# Patient Record
Sex: FEMALE | Race: WHITE | Employment: OTHER | URBAN - METROPOLITAN AREA
[De-identification: names, ages, dates, MRNs, and addresses within clinical notes are randomized per-mention and may not be internally consistent; named-entity substitution may affect disease eponyms.]

---

## 2018-05-08 ENCOUNTER — HOSPITAL ENCOUNTER (OUTPATIENT)
Age: 73
Discharge: HOME | End: 2018-05-10
Payer: MEDICARE

## 2018-05-08 PROCEDURE — 82565 ASSAY OF CREATININE: CPT

## 2018-05-08 PROCEDURE — 88112 CYTOPATH CELL ENHANCE TECH: CPT

## 2018-05-08 PROCEDURE — 84520 ASSAY OF UREA NITROGEN: CPT

## 2019-01-04 ENCOUNTER — HOSPITAL ENCOUNTER (OUTPATIENT)
Age: 74
Discharge: HOME | End: 2019-01-06
Payer: MEDICARE

## 2019-01-04 LAB
BACTERIA: ABNORMAL /HPF
BILIRUBIN URINE: NEGATIVE
BLOOD, URINE: ABNORMAL
CLARITY: CLEAR
COLOR: YELLOW
GLUCOSE URINE: NEGATIVE MG/DL
KETONES, URINE: NEGATIVE MG/DL
LEUKOCYTE ESTERASE, URINE: ABNORMAL
NITRITE, URINE: NEGATIVE
PH UA: 6 (ref 5–9)
PROTEIN UA: NEGATIVE MG/DL
RBC UA: ABNORMAL /HPF (ref 0–2)
SPECIFIC GRAVITY UA: 1.02 (ref 1–1.03)
UROBILINOGEN, URINE: 0.2 E.U./DL
WBC UA: ABNORMAL /HPF (ref 0–5)

## 2019-01-04 PROCEDURE — 88112 CYTOPATH CELL ENHANCE TECH: CPT

## 2019-01-04 PROCEDURE — 81001 URINALYSIS AUTO W/SCOPE: CPT

## 2019-01-04 PROCEDURE — 87088 URINE BACTERIA CULTURE: CPT

## 2019-01-06 LAB — URINE CULTURE, ROUTINE: NORMAL

## 2019-01-07 LAB — URINE CULTURE, ROUTINE: NORMAL

## 2019-04-18 LAB
ALBUMIN SERPL-MCNC: NORMAL G/DL
ALP BLD-CCNC: NORMAL U/L
ALT SERPL-CCNC: NORMAL U/L
ANION GAP SERPL CALCULATED.3IONS-SCNC: NORMAL MMOL/L
AST SERPL-CCNC: NORMAL U/L
BILIRUB SERPL-MCNC: NORMAL MG/DL (ref 0.1–1.4)
BUN BLDV-MCNC: NORMAL MG/DL
CALCIUM SERPL-MCNC: NORMAL MG/DL
CHLORIDE BLD-SCNC: NORMAL MMOL/L
CHOLESTEROL, TOTAL: NORMAL MG/DL
CHOLESTEROL/HDL RATIO: NORMAL
CO2: NORMAL MMOL/L
CREAT SERPL-MCNC: NORMAL MG/DL
GFR CALCULATED: NORMAL
GLUCOSE BLD-MCNC: NORMAL MG/DL
HDLC SERPL-MCNC: NORMAL MG/DL (ref 35–70)
LDL CHOLESTEROL CALCULATED: NORMAL MG/DL (ref 0–160)
POTASSIUM SERPL-SCNC: NORMAL MMOL/L
SODIUM BLD-SCNC: NORMAL MMOL/L
TOTAL PROTEIN: NORMAL
TRIGL SERPL-MCNC: NORMAL MG/DL
VLDLC SERPL CALC-MCNC: NORMAL MG/DL

## 2019-07-05 RX ORDER — ATORVASTATIN CALCIUM 20 MG/1
20 TABLET, FILM COATED ORAL DAILY
Qty: 90 TABLET | Refills: 0 | OUTPATIENT
Start: 2019-07-05

## 2019-07-08 RX ORDER — ATORVASTATIN CALCIUM 10 MG/1
TABLET, FILM COATED ORAL
Qty: 30 TABLET | Refills: 2 | Status: CANCELLED | OUTPATIENT
Start: 2019-07-08

## 2019-07-08 RX ORDER — DULOXETIN HYDROCHLORIDE 30 MG/1
30 CAPSULE, DELAYED RELEASE ORAL DAILY
Qty: 30 CAPSULE | Refills: 2 | Status: CANCELLED | OUTPATIENT
Start: 2019-07-08

## 2019-07-08 RX ORDER — ATORVASTATIN CALCIUM 10 MG/1
TABLET, FILM COATED ORAL
Qty: 30 TABLET | Refills: 2 | Status: SHIPPED | OUTPATIENT
Start: 2019-07-08 | End: 2019-10-01 | Stop reason: SDUPTHER

## 2019-07-08 RX ORDER — DULOXETIN HYDROCHLORIDE 30 MG/1
30 CAPSULE, DELAYED RELEASE ORAL DAILY
Qty: 30 CAPSULE | Refills: 2 | Status: SHIPPED | OUTPATIENT
Start: 2019-07-08 | End: 2019-11-25 | Stop reason: SDUPTHER

## 2019-07-08 NOTE — TELEPHONE ENCOUNTER
Spoke with patient and she has 10mg on her bottle at home that she has been taking. Also requested refill on Cymbalta.

## 2019-07-08 NOTE — TELEPHONE ENCOUNTER
It makes sense that her bottle says 10mg, that is what I have been prescribing. I was confused when 20mg pill was pended. I didn't want to send in the wrong dose. I approved pended meds, have her get bw and fu as previously directed  (see message below).  thx

## 2019-09-30 DIAGNOSIS — Z00.00 HEALTH MAINTENANCE EXAMINATION: Primary | ICD-10-CM

## 2019-10-01 RX ORDER — ATORVASTATIN CALCIUM 10 MG/1
10 TABLET, FILM COATED ORAL DAILY
Qty: 30 TABLET | Refills: 1 | Status: SHIPPED | OUTPATIENT
Start: 2019-10-01

## 2019-10-25 RX ORDER — METOPROLOL TARTRATE 50 MG/1
25 TABLET, FILM COATED ORAL 2 TIMES DAILY
COMMUNITY

## 2019-11-15 DIAGNOSIS — Z00.00 HEALTH MAINTENANCE EXAMINATION: ICD-10-CM

## 2019-11-22 ENCOUNTER — HOSPITAL ENCOUNTER (OUTPATIENT)
Age: 74
Discharge: HOME | End: 2019-11-24
Payer: MEDICARE

## 2019-11-22 LAB
ALBUMIN SERPL-MCNC: 4.1 G/DL (ref 3.5–5.2)
ALP BLD-CCNC: 70 U/L (ref 35–104)
ALT SERPL-CCNC: 9 U/L (ref 0–32)
ANION GAP SERPL CALCULATED.3IONS-SCNC: 16 MMOL/L (ref 7–16)
AST SERPL-CCNC: 12 U/L (ref 0–31)
BACTERIA: ABNORMAL /HPF
BASOPHILS ABSOLUTE: 0.06 E9/L (ref 0–0.2)
BASOPHILS RELATIVE PERCENT: 0.9 % (ref 0–2)
BILIRUB SERPL-MCNC: 0.7 MG/DL (ref 0–1.2)
BILIRUBIN URINE: NEGATIVE
BLOOD, URINE: ABNORMAL
BUN BLDV-MCNC: 9 MG/DL (ref 8–23)
CALCIUM SERPL-MCNC: 9.2 MG/DL (ref 8.6–10.2)
CHLORIDE BLD-SCNC: 103 MMOL/L (ref 98–107)
CHOLESTEROL, TOTAL: 117 MG/DL (ref 0–199)
CLARITY: ABNORMAL
CO2: 23 MMOL/L (ref 22–29)
COLOR: YELLOW
CREAT SERPL-MCNC: 0.7 MG/DL (ref 0.5–1)
CREATININE URINE: 165 MG/DL (ref 29–226)
EOSINOPHILS ABSOLUTE: 0.14 E9/L (ref 0.05–0.5)
EOSINOPHILS RELATIVE PERCENT: 2.2 % (ref 0–6)
EPITHELIAL CELLS, UA: ABNORMAL /HPF
GFR AFRICAN AMERICAN: >60
GFR NON-AFRICAN AMERICAN: >60 ML/MIN/1.73
GLUCOSE BLD-MCNC: 156 MG/DL (ref 74–99)
GLUCOSE URINE: NEGATIVE MG/DL
HBA1C MFR BLD: 7 % (ref 4–5.6)
HCT VFR BLD CALC: 43.9 % (ref 34–48)
HDLC SERPL-MCNC: 53 MG/DL
HEMOGLOBIN: 13.5 G/DL (ref 11.5–15.5)
IMMATURE GRANULOCYTES #: 0.02 E9/L
IMMATURE GRANULOCYTES %: 0.3 % (ref 0–5)
KETONES, URINE: NEGATIVE MG/DL
LDL CHOLESTEROL CALCULATED: 42 MG/DL (ref 0–99)
LEUKOCYTE ESTERASE, URINE: ABNORMAL
LYMPHOCYTES ABSOLUTE: 1.51 E9/L (ref 1.5–4)
LYMPHOCYTES RELATIVE PERCENT: 23.6 % (ref 20–42)
MCH RBC QN AUTO: 28.1 PG (ref 26–35)
MCHC RBC AUTO-ENTMCNC: 30.8 % (ref 32–34.5)
MCV RBC AUTO: 91.5 FL (ref 80–99.9)
MICROALBUMIN UR-MCNC: <12 MG/L
MICROALBUMIN/CREAT UR-RTO: ABNORMAL (ref 0–30)
MONOCYTES ABSOLUTE: 0.47 E9/L (ref 0.1–0.95)
MONOCYTES RELATIVE PERCENT: 7.3 % (ref 2–12)
NEUTROPHILS ABSOLUTE: 4.2 E9/L (ref 1.8–7.3)
NEUTROPHILS RELATIVE PERCENT: 65.7 % (ref 43–80)
NITRITE, URINE: NEGATIVE
PDW BLD-RTO: 13 FL (ref 11.5–15)
PH UA: 5.5 (ref 5–9)
PLATELET # BLD: 231 E9/L (ref 130–450)
PMV BLD AUTO: 12.5 FL (ref 7–12)
POTASSIUM SERPL-SCNC: 4.5 MMOL/L (ref 3.5–5)
PROTEIN UA: NEGATIVE MG/DL
RBC # BLD: 4.8 E12/L (ref 3.5–5.5)
RBC UA: ABNORMAL /HPF (ref 0–2)
SODIUM BLD-SCNC: 142 MMOL/L (ref 132–146)
SPECIFIC GRAVITY UA: 1.02 (ref 1–1.03)
TOTAL CK: 61 U/L (ref 20–180)
TOTAL PROTEIN: 6.9 G/DL (ref 6.4–8.3)
TRIGL SERPL-MCNC: 108 MG/DL (ref 0–149)
TSH SERPL DL<=0.05 MIU/L-ACNC: 1.98 UIU/ML (ref 0.27–4.2)
UROBILINOGEN, URINE: 0.2 E.U./DL
VLDLC SERPL CALC-MCNC: 22 MG/DL
WBC # BLD: 6.4 E9/L (ref 4.5–11.5)
WBC UA: ABNORMAL /HPF (ref 0–5)

## 2019-11-22 PROCEDURE — 83036 HEMOGLOBIN GLYCOSYLATED A1C: CPT

## 2019-11-22 PROCEDURE — 81001 URINALYSIS AUTO W/SCOPE: CPT

## 2019-11-22 PROCEDURE — 80061 LIPID PANEL: CPT

## 2019-11-22 PROCEDURE — 82550 ASSAY OF CK (CPK): CPT

## 2019-11-22 PROCEDURE — 82570 ASSAY OF URINE CREATININE: CPT

## 2019-11-22 PROCEDURE — 85025 COMPLETE CBC W/AUTO DIFF WBC: CPT

## 2019-11-22 PROCEDURE — 84443 ASSAY THYROID STIM HORMONE: CPT

## 2019-11-22 PROCEDURE — 82044 UR ALBUMIN SEMIQUANTITATIVE: CPT

## 2019-11-22 PROCEDURE — 80053 COMPREHEN METABOLIC PANEL: CPT

## 2019-11-22 PROCEDURE — 36415 COLL VENOUS BLD VENIPUNCTURE: CPT

## 2019-11-25 ENCOUNTER — OFFICE VISIT (OUTPATIENT)
Dept: PRIMARY CARE CLINIC | Age: 74
End: 2019-11-25
Payer: MEDICARE

## 2019-11-25 VITALS
BODY MASS INDEX: 38.35 KG/M2 | HEART RATE: 66 BPM | SYSTOLIC BLOOD PRESSURE: 134 MMHG | OXYGEN SATURATION: 93 % | WEIGHT: 224.6 LBS | HEIGHT: 64 IN | DIASTOLIC BLOOD PRESSURE: 72 MMHG

## 2019-11-25 DIAGNOSIS — R31.29 OTHER MICROSCOPIC HEMATURIA: ICD-10-CM

## 2019-11-25 DIAGNOSIS — E11.65 TYPE 2 DIABETES MELLITUS WITH HYPERGLYCEMIA, WITHOUT LONG-TERM CURRENT USE OF INSULIN (HCC): Primary | ICD-10-CM

## 2019-11-25 DIAGNOSIS — E55.9 VITAMIN D DEFICIENCY: ICD-10-CM

## 2019-11-25 DIAGNOSIS — M85.80 OSTEOPENIA, UNSPECIFIED LOCATION: ICD-10-CM

## 2019-11-25 DIAGNOSIS — Z00.00 HEALTH MAINTENANCE EXAMINATION: ICD-10-CM

## 2019-11-25 DIAGNOSIS — E78.2 MIXED HYPERLIPIDEMIA: ICD-10-CM

## 2019-11-25 DIAGNOSIS — M79.7 FIBROMYALGIA: ICD-10-CM

## 2019-11-25 DIAGNOSIS — I10 ESSENTIAL HYPERTENSION: ICD-10-CM

## 2019-11-25 PROCEDURE — G8417 CALC BMI ABV UP PARAM F/U: HCPCS | Performed by: FAMILY MEDICINE

## 2019-11-25 PROCEDURE — 4040F PNEUMOC VAC/ADMIN/RCVD: CPT | Performed by: FAMILY MEDICINE

## 2019-11-25 PROCEDURE — 1123F ACP DISCUSS/DSCN MKR DOCD: CPT | Performed by: FAMILY MEDICINE

## 2019-11-25 PROCEDURE — G8399 PT W/DXA RESULTS DOCUMENT: HCPCS | Performed by: FAMILY MEDICINE

## 2019-11-25 PROCEDURE — G8482 FLU IMMUNIZE ORDER/ADMIN: HCPCS | Performed by: FAMILY MEDICINE

## 2019-11-25 PROCEDURE — 1036F TOBACCO NON-USER: CPT | Performed by: FAMILY MEDICINE

## 2019-11-25 PROCEDURE — 3017F COLORECTAL CA SCREEN DOC REV: CPT | Performed by: FAMILY MEDICINE

## 2019-11-25 PROCEDURE — 99215 OFFICE O/P EST HI 40 MIN: CPT | Performed by: FAMILY MEDICINE

## 2019-11-25 PROCEDURE — G8427 DOCREV CUR MEDS BY ELIG CLIN: HCPCS | Performed by: FAMILY MEDICINE

## 2019-11-25 PROCEDURE — 3045F PR MOST RECENT HEMOGLOBIN A1C LEVEL 7.0-9.0%: CPT | Performed by: FAMILY MEDICINE

## 2019-11-25 PROCEDURE — 2022F DILAT RTA XM EVC RTNOPTHY: CPT | Performed by: FAMILY MEDICINE

## 2019-11-25 PROCEDURE — 1090F PRES/ABSN URINE INCON ASSESS: CPT | Performed by: FAMILY MEDICINE

## 2019-11-25 PROCEDURE — G9899 SCRN MAM PERF RSLTS DOC: HCPCS | Performed by: FAMILY MEDICINE

## 2019-11-25 RX ORDER — MELOXICAM 15 MG/1
15 TABLET ORAL DAILY
Qty: 90 TABLET | Refills: 3 | Status: ON HOLD
Start: 2019-11-25 | End: 2022-03-23 | Stop reason: HOSPADM

## 2019-11-25 RX ORDER — DULOXETIN HYDROCHLORIDE 30 MG/1
30 CAPSULE, DELAYED RELEASE ORAL DAILY
Qty: 90 CAPSULE | Refills: 3 | Status: SHIPPED | OUTPATIENT
Start: 2019-11-25

## 2019-11-25 ASSESSMENT — PATIENT HEALTH QUESTIONNAIRE - PHQ9
SUM OF ALL RESPONSES TO PHQ9 QUESTIONS 1 & 2: 0
SUM OF ALL RESPONSES TO PHQ QUESTIONS 1-9: 0
SUM OF ALL RESPONSES TO PHQ QUESTIONS 1-9: 0
1. LITTLE INTEREST OR PLEASURE IN DOING THINGS: 0
2. FEELING DOWN, DEPRESSED OR HOPELESS: 0

## 2019-12-02 ENCOUNTER — TELEPHONE (OUTPATIENT)
Dept: PRIMARY CARE CLINIC | Age: 74
End: 2019-12-02

## 2019-12-25 PROBLEM — Z00.00 HEALTH MAINTENANCE EXAMINATION: Status: RESOLVED | Noted: 2019-11-25 | Resolved: 2019-12-25

## 2019-12-31 DIAGNOSIS — M79.7 FIBROMYALGIA: ICD-10-CM

## 2022-03-22 RX ORDER — VALSARTAN 320 MG/1
320 TABLET ORAL DAILY
COMMUNITY

## 2022-03-22 NOTE — H&P
History and Physical    Patient's Name/Date of Birth: Percy Jan / 1945 (16 y.o.)    Date: March 22, 2022     Chief Complaint: right index finger infection    HPI: This is a 78years of age, left -hand-dominant, white female, who has progressive redness swelling and pain related to the right index finger P3 tissues. Patient describes progression of the symptoms and signs in and around the nail folds. She has elevation of the nail plate. She has green discoloration now of the nail plate with seemingly infection underneath the nail plate. She did go to urgent care on a Sunday night because of of her overall condition. They did rishabh the proximal nail fold seemingly with a needle and no true incision. There is very little purulent drainage, no patient was instructed on Epson salt soaks and mupirocin ointment for dressing care. She describes worsening of the condition with increasing redness to a severe extent, increasing pain to severe, and a dramatic decrease in range of motion and decreased overall right index finger and hand function. Past Medical History:   Diagnosis Date    Arthritis     Diabetes mellitus ([address]     Fibromyalgia     Hypertension     Osteoarthritis     Spinal stenosis     Type 2 diabetes mellitus without complication ([address]        Past Surgical History:   Procedure Laterality Date    CARDIAC CATHETERIZATION  2005    NL after false positive stress test    CARPAL TUNNEL RELEASE  1990    HEEL SPUR SURGERY Bilateral 1995    HYSTERECTOMY      WITH BSO FOR EXCESSIVE BLEEDING AND FIBROIDS    JOINT REPLACEMENT      JOINT REPLACEMENT Right     4/28/2015    KNEE ARTHROPLASTY Left 2005    KNEE ARTHROPLASTY Right 2015    TONSILLECTOMY AND ADENOIDECTOMY         Prior to Admission medications    Medication Sig Start Date End Date Taking?  Authorizing Provider   valsartan (DIOVAN) 320 MG tablet Take 320 mg by mouth daily   Yes Historical Provider, MD   meloxicam (MOBIC) 15 MG tablet Take 1 tablet by mouth daily PRN  Patient taking differently: Take 15 mg by mouth daily  11/25/19   Myke Morton MD   DULoxetine (CYMBALTA) 30 MG extended release capsule Take 1 capsule by mouth daily  Patient taking differently: Take 30 mg by mouth at bedtime  11/25/19   Myke Morton MD   metFORMIN (GLUCOPHAGE) 1000 MG tablet Take 1,000 mg by mouth 2 times daily (with meals)    Historical Provider, MD   metoprolol tartrate (LOPRESSOR) 50 MG tablet Take 25 mg by mouth 2 times daily     Historical Provider, MD   atorvastatin (LIPITOR) 10 MG tablet Take 1 tablet by mouth daily One by mouth once daily 10/1/19   Myke Morton MD   Handicap Placard MISC Duration 5 years 9/30/19   Myke Morton MD       Diclofenac and Diclofenac sodium    Family History   Problem Relation Age of Onset    Cancer Mother 47        MULTIPLE MYELOMA    Heart Attack Father 46    Arthritis Sister         OSTEOARTHRITIS    Obesity Sister     Stroke Other        Social History     Socioeconomic History    Marital status:      Spouse name: Not on file    Number of children: Not on file    Years of education: Not on file    Highest education level: Not on file   Occupational History    Not on file   Tobacco Use    Smoking status: Never Smoker    Smokeless tobacco: Never Used   Substance and Sexual Activity    Alcohol use: Yes     Comment: social    Drug use: No    Sexual activity: Not on file   Other Topics Concern    Not on file   Social History Narrative    [facility]:    Shot Record: 90732-Pneumococcal Vaccine (Pneumovax) 06/29/16    Problem List: Essential hypertension    Health Maintenance:    Bone Density Scan - (11/2/2018)    Mammogram - (11/2/2018)    Mammogram Screening - (11/2/2018)    Colonoscopy - (5/4/2009)    Couseled on Home Safety - (4/26/2017)    Pneumonia Vaccination - (6/29/2016)    Medical Problems:    Type 2 Diabetes    OA - has been on nsaids since 20's    Fibromyalgia, Hypertension    Gib - since been on celebrex and doing well    Spinal Stenosis    Surgical Hx:    Cardiac Cath -  - NL after false positive stress test    Left Knee arthroplasty -     Rt Knee arthroplasty -     B/L Heel Spur -     Carpal Tunnel Release -     Total Hysterectomy - with BSO for excessive bleeding and fibroids    T & A    Reviewed, no changes. FH:    Father:    . (Hx)    Mother:    . (Hx)    Brother 1:    . (Hx)    Sister 1:    . (Hx)    Dad -  MI sudden 46, very obese    Mom -  Multiple Myeloma 47    Sister - 76 as of 2016, OA, Obesity    Daughter CVA age 52    Reviewed and updated. SH:    . 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 Alcohol: Denies alcohol use. . (Drug Use)    Reviewed, no changes. Social Determinants of Health     Financial Resource Strain:     Difficulty of Paying Living Expenses: Not on file   Food Insecurity:     Worried About Running Out of Food in the Last Year: Not on file    Lauro of Food in the Last Year: Not on file   Transportation Needs:     Lack of Transportation (Medical): Not on file    Lack of Transportation (Non-Medical):  Not on file   Physical Activity:     Days of Exercise per Week: Not on file    Minutes of Exercise per Session: Not on file   Stress:     Feeling of Stress : Not on file   Social Connections:     Frequency of Communication with Friends and Family: Not on file    Frequency of Social Gatherings with Friends and Family: Not on file    Attends Hoahaoism Services: Not on file    Active Member of Clubs or Organizations: Not on file    Attends Club or Organization Meetings: Not on file    Marital Status: Not on file   Intimate Partner Violence:     Fear of Current or Ex-Partner: Not on file  Emotionally Abused: Not on file    Physically Abused: Not on file    Sexually Abused: Not on file   Housing Stability:     Unable to Pay for Housing in the Last Year: Not on file    Number of Places Lived in the Last Year: Not on file    Unstable Housing in the Last Year: Not on file       Review of Systems:   CONSTITUTIONAL:  negative  EYES:  negative  HEENT:  negative  RESPIRATORY:  negative  CARDIOVASCULAR:  negative  GASTROINTESTINAL:  negative  GENITOURINARY:  negative  INTEGUMENT/BREAST:  negative  HEMATOLOGIC/LYMPHATIC:  negative  ALLERGIC/IMMUNOLOGIC:  negative  ENDOCRINE:  negative  MUSCULOSKELETAL:  negative  NEUROLOGICAL:  negative  BEHAVIOR/PSYCH:  negative    Physical Exam:  Vitals:    03/22/22 1650   Weight: 217 lb (98.4 kg)   Height: 5' 5\" (1.651 m)       CONSTITUTIONAL:  awake, alert, cooperative, no apparent distress, and appears stated age  EYES:  Lids and lashes normal, pupils equal, round and reactive to light, extra ocular muscles intact, sclera clear, conjunctiva normal  ENT:  Normocephalic, without obvious abnormality, atraumatic, sinuses nontender on palpation, external ears without lesions, oral pharynx with moist mucus membranes, tonsils without erythema or exudates, gums normal and good dentition.   NECK:  Supple, symmetrical, trachea midline, no adenopathy, thyroid symmetric, not enlarged and no tenderness, skin normal  HEMATOLOGIC/LYMPHATICS:  no cervical lymphadenopathy  BACK:  Symmetric, no curvature, spinous processes are non-tender on palpation, paraspinous muscles are non-tender on palpation, no costal vertebral tenderness  LUNGS:  No increased work of breathing, good air exchange, clear to auscultation bilaterally, no crackles or wheezing  CARDIOVASCULAR:  normal S1 and S2  ABDOMEN:  deferred  CHEST/BREASTS:  deferred  GENITAL/URINARY:  deferred  MUSCULOSKELETAL:  + red discoloration, severe, right index finger  + edema , severe right index finger  + pain , severe, right index finger  + green nail, right index finger  NEUROLOGIC:  Awake, alert, oriented to name, place and time. Cranial nerves II-XII are grossly intact. Motor is 5 out of 5 bilaterally. Cerebellar finger to nose, heel to shin intact. Sensory is intact. Babinski down going, Romberg negative, and gait is normal.  SKIN:  See above    Assessment:  Active Problems:    * No active hospital problems. *  Resolved Problems:    * No resolved hospital problems. *  1. Right index finger- infection/ abscess/ paronychia    Plan:  1. Right index finger- complete nail avulsion  2.  Right index finger- Incision & Drainage and irrigation    Electronically signed by Hood Lara MD on 3/22/22 at 4:54 PM EDT

## 2022-03-22 NOTE — PROGRESS NOTES
[facility] PRE-ADMISSION TESTING INSTRUCTIONS    The Preadmission Testing patient is instructed accordingly using the following criteria (check applicable):    ARRIVAL INSTRUCTIONS:  [x] Parking the day of Surgery is located in the Main Entrance lot. Upon entering the door, make an immediate right to the surgery reception desk    [x] Bring photo ID and insurance card    [x] Bring in a copy of Living will or Durable Power of  papers. [x] Please be sure to arrange for responsible adult to provide transportation to and from the hospital    [x] Please arrange for responsible adult to be with you for the 24 hour period post procedure due to having anesthesia      GENERAL INSTRUCTIONS:    [x] Nothing by mouth after midnight, including gum, candy, mints or water    [x] You may brush your teeth, but do not swallow any water    [x] Take medications as instructed with 1-2 oz of water    [x] Stop herbal supplements and vitamins 5 days prior to procedure    [] Follow preop dosing of blood thinners per physician instructions    [] Take 1/2 dose of evening insulin, but no insulin after midnight    [x] No oral diabetic medications after midnight    [] If diabetic and have low blood sugar or feel symptomatic, take 1-2oz apple juice only    [] Bring inhalers day of surgery    [] Bring C-PAP/ Bi-Pap day of surgery    [] Bring urine specimen day of surgery    [x] Shower or bath with soap, lather and rinse well, AM of Surgery, no lotion, powders or creams to surgical site    [] Follow bowel prep as instructed per surgeon    [x] No tobacco products within 24 hours of surgery     [x] No alcohol or illegal drug use within 24 hours of surgery.     [x] Jewelry, body piercing's, eyeglasses, contact lenses and dentures are not permitted into surgery (bring cases)      [x] Please do not wear any nail polish, make up or hair products on the day of surgery    [x] You can expect a call the business day prior to procedure to notify you if your arrival time changes    [x] If you receive a survey after surgery we would greatly appreciate your comments    [] Parent/guardian of a minor must accompany their child and remain on the premises  the entire time they are under our care     [] Pediatric patients may bring favorite toy, blanket or comfort item with them    [] A caregiver or family member must remain with the patient during their stay if they are mentally handicapped, have dementia, disoriented or unable to use a call light or would be a safety concern if left unattended    [x] Please notify surgeon if you develop any illness between now and time of surgery (cold, cough, sore throat, fever, nausea, vomiting) or any signs of infections  including skin, wounds, and dental.    [x]  The Outpatient Pharmacy is available to fill your prescription here on your day of surgery, ask your preop nurse for details    [] Other instructions    EDUCATIONAL MATERIALS PROVIDED:    [] PAT Preoperative Education Packet/Booklet     [] Medication List    [] Transfusion bracelet applied with instructions    [] Shower with soap, lather and rinse well, and use CHG wipes provided the evening before surgery as instructed    [] Incentive spirometer with instructions

## 2022-03-23 ENCOUNTER — ANESTHESIA EVENT (OUTPATIENT)
Dept: OPERATING ROOM | Age: 77
End: 2022-03-23
Payer: MEDICARE

## 2022-03-23 ENCOUNTER — HOSPITAL ENCOUNTER (OUTPATIENT)
Age: 77
Setting detail: OUTPATIENT SURGERY
Discharge: HOME | End: 2022-03-23
Attending: SURGERY | Admitting: SURGERY
Payer: MEDICARE

## 2022-03-23 ENCOUNTER — ANESTHESIA (OUTPATIENT)
Dept: OPERATING ROOM | Age: 77
End: 2022-03-23
Payer: MEDICARE

## 2022-03-23 VITALS — SYSTOLIC BLOOD PRESSURE: 149 MMHG | DIASTOLIC BLOOD PRESSURE: 69 MMHG | OXYGEN SATURATION: 100 %

## 2022-03-23 VITALS
HEART RATE: 78 BPM | DIASTOLIC BLOOD PRESSURE: 73 MMHG | OXYGEN SATURATION: 95 % | TEMPERATURE: 98.1 F | SYSTOLIC BLOOD PRESSURE: 169 MMHG | HEIGHT: 65 IN | RESPIRATION RATE: 16 BRPM | BODY MASS INDEX: 36.15 KG/M2 | WEIGHT: 217 LBS

## 2022-03-23 DIAGNOSIS — L03.011: Primary | ICD-10-CM

## 2022-03-23 PROCEDURE — 7100000010 HC PHASE II RECOVERY - FIRST 15 MIN: Performed by: SURGERY

## 2022-03-23 PROCEDURE — 2709999900 HC NON-CHARGEABLE SUPPLY: Performed by: SURGERY

## 2022-03-23 PROCEDURE — 87206 SMEAR FLUORESCENT/ACID STAI: CPT

## 2022-03-23 PROCEDURE — 6360000002 HC RX W HCPCS: Performed by: SURGERY

## 2022-03-23 PROCEDURE — 87186 SC STD MICRODIL/AGAR DIL: CPT

## 2022-03-23 PROCEDURE — 3700000000 HC ANESTHESIA ATTENDED CARE: Performed by: SURGERY

## 2022-03-23 PROCEDURE — 7100000011 HC PHASE II RECOVERY - ADDTL 15 MIN: Performed by: SURGERY

## 2022-03-23 PROCEDURE — 87106 FUNGI IDENTIFICATION YEAST: CPT

## 2022-03-23 PROCEDURE — 87075 CULTR BACTERIA EXCEPT BLOOD: CPT

## 2022-03-23 PROCEDURE — 3700000001 HC ADD 15 MINUTES (ANESTHESIA): Performed by: SURGERY

## 2022-03-23 PROCEDURE — 2500000003 HC RX 250 WO HCPCS: Performed by: SURGERY

## 2022-03-23 PROCEDURE — 87205 SMEAR GRAM STAIN: CPT

## 2022-03-23 PROCEDURE — 87102 FUNGUS ISOLATION CULTURE: CPT

## 2022-03-23 PROCEDURE — 3600000012 HC SURGERY LEVEL 2 ADDTL 15MIN: Performed by: SURGERY

## 2022-03-23 PROCEDURE — 87176 TISSUE HOMOGENIZATION CULTR: CPT

## 2022-03-23 PROCEDURE — 3600000002 HC SURGERY LEVEL 2 BASE: Performed by: SURGERY

## 2022-03-23 PROCEDURE — 6360000002 HC RX W HCPCS: Performed by: NURSE ANESTHETIST, CERTIFIED REGISTERED

## 2022-03-23 PROCEDURE — 87077 CULTURE AEROBIC IDENTIFY: CPT

## 2022-03-23 PROCEDURE — 2580000003 HC RX 258: Performed by: NURSE ANESTHETIST, CERTIFIED REGISTERED

## 2022-03-23 PROCEDURE — 2500000003 HC RX 250 WO HCPCS: Performed by: NURSE ANESTHETIST, CERTIFIED REGISTERED

## 2022-03-23 PROCEDURE — 87070 CULTURE OTHR SPECIMN AEROBIC: CPT

## 2022-03-23 RX ORDER — KETAMINE HYDROCHLORIDE 10 MG/ML
INJECTION, SOLUTION INTRAMUSCULAR; INTRAVENOUS PRN
Status: DISCONTINUED | OUTPATIENT
Start: 2022-03-23 | End: 2022-03-23 | Stop reason: SDUPTHER

## 2022-03-23 RX ORDER — ONDANSETRON 2 MG/ML
INJECTION INTRAMUSCULAR; INTRAVENOUS PRN
Status: DISCONTINUED | OUTPATIENT
Start: 2022-03-23 | End: 2022-03-23 | Stop reason: SDUPTHER

## 2022-03-23 RX ORDER — GLYCOPYRROLATE 1 MG/5 ML
SYRINGE (ML) INTRAVENOUS PRN
Status: DISCONTINUED | OUTPATIENT
Start: 2022-03-23 | End: 2022-03-23 | Stop reason: SDUPTHER

## 2022-03-23 RX ORDER — HYDROCODONE BITARTRATE AND ACETAMINOPHEN 5; 325 MG/1; MG/1
1 TABLET ORAL EVERY 4 HOURS PRN
Qty: 9 TABLET | Refills: 0 | Status: SHIPPED | OUTPATIENT
Start: 2022-03-23 | End: 2022-03-26

## 2022-03-23 RX ORDER — PROPOFOL 10 MG/ML
INJECTION, EMULSION INTRAVENOUS CONTINUOUS PRN
Status: DISCONTINUED | OUTPATIENT
Start: 2022-03-23 | End: 2022-03-23 | Stop reason: SDUPTHER

## 2022-03-23 RX ORDER — SODIUM CHLORIDE 9 MG/ML
INJECTION, SOLUTION INTRAVENOUS CONTINUOUS PRN
Status: DISCONTINUED | OUTPATIENT
Start: 2022-03-23 | End: 2022-03-23 | Stop reason: SDUPTHER

## 2022-03-23 RX ORDER — DEXAMETHASONE SODIUM PHOSPHATE 4 MG/ML
INJECTION, SOLUTION INTRA-ARTICULAR; INTRALESIONAL; INTRAMUSCULAR; INTRAVENOUS; SOFT TISSUE PRN
Status: DISCONTINUED | OUTPATIENT
Start: 2022-03-23 | End: 2022-03-23 | Stop reason: SDUPTHER

## 2022-03-23 RX ORDER — MIDAZOLAM HYDROCHLORIDE 1 MG/ML
INJECTION INTRAMUSCULAR; INTRAVENOUS PRN
Status: DISCONTINUED | OUTPATIENT
Start: 2022-03-23 | End: 2022-03-23 | Stop reason: SDUPTHER

## 2022-03-23 RX ORDER — AMOXICILLIN AND CLAVULANATE POTASSIUM 875; 125 MG/1; MG/1
1 TABLET, FILM COATED ORAL 2 TIMES DAILY
Qty: 21 TABLET | Refills: 1 | Status: SHIPPED | OUTPATIENT
Start: 2022-03-23

## 2022-03-23 RX ADMIN — MIDAZOLAM 1 MG: 1 INJECTION INTRAMUSCULAR; INTRAVENOUS at 15:45

## 2022-03-23 RX ADMIN — KETAMINE HYDROCHLORIDE 25 MG: 10 INJECTION INTRAMUSCULAR; INTRAVENOUS at 15:50

## 2022-03-23 RX ADMIN — Medication 0.2 MG: at 15:45

## 2022-03-23 RX ADMIN — MIDAZOLAM 1 MG: 1 INJECTION INTRAMUSCULAR; INTRAVENOUS at 15:35

## 2022-03-23 RX ADMIN — Medication 2000 MG: at 15:40

## 2022-03-23 RX ADMIN — PROPOFOL 50 MCG/KG/MIN: 10 INJECTION, EMULSION INTRAVENOUS at 15:45

## 2022-03-23 RX ADMIN — ONDANSETRON 4 MG: 2 INJECTION INTRAMUSCULAR; INTRAVENOUS at 15:45

## 2022-03-23 RX ADMIN — SODIUM CHLORIDE: 9 INJECTION, SOLUTION INTRAVENOUS at 15:35

## 2022-03-23 RX ADMIN — SODIUM CHLORIDE: 9 INJECTION, SOLUTION INTRAVENOUS at 16:20

## 2022-03-23 RX ADMIN — DEXAMETHASONE SODIUM PHOSPHATE 8 MG: 4 INJECTION, SOLUTION INTRAMUSCULAR; INTRAVENOUS at 15:45

## 2022-03-23 RX ADMIN — KETAMINE HYDROCHLORIDE 25 MG: 10 INJECTION INTRAMUSCULAR; INTRAVENOUS at 15:45

## 2022-03-23 ASSESSMENT — PAIN SCALES - GENERAL
PAINLEVEL_OUTOF10: 0
PAINLEVEL_OUTOF10: 0

## 2022-03-23 ASSESSMENT — PULMONARY FUNCTION TESTS
PIF_VALUE: 0
PIF_VALUE: 1
PIF_VALUE: 0

## 2022-03-23 ASSESSMENT — PAIN - FUNCTIONAL ASSESSMENT: PAIN_FUNCTIONAL_ASSESSMENT: 0-10

## 2022-03-23 ASSESSMENT — PAIN DESCRIPTION - DESCRIPTORS: DESCRIPTORS: THROBBING

## 2022-03-23 NOTE — PROGRESS NOTES
Discharge instructions gone over with patient, medications reviewed with patient. All questions answered, patient verbalized understanding. Pt has transportation home with family and will have adult supervision. After anesthesia instructions gone over and patient verbalized understanding.

## 2022-03-23 NOTE — ANESTHESIA POSTPROCEDURE EVALUATION
Department of Anesthesiology  Postprocedure Note    Patient: Drew Solano  MRN: [id number]  [place]: 1945  Date of evaluation: 3/23/2022  Time:  4:34 PM     Procedure Summary     Date: 03/23/22 Room / Location: [facility] OR 03 / [facility]    Anesthesia Start: [place] Anesthesia Stop: 1633    Procedure: RIGHT INDEX FINGER P3 INCISION AND DRAINAGE AND IRRIGATION (Right Fingers) Diagnosis: (FINGER ABSCESS)    Surgeons: Maite Fernandez MD Responsible Provider: Madi Morrissey MD    Anesthesia Type: MAC ASA Status: 3          Anesthesia Type: MAC    Jim Phase I: Jim Score: 10    Jim Phase II:      Last vitals: Reviewed and per EMR flowsheets.        Anesthesia Post Evaluation    Patient location during evaluation: PACU  Patient participation: complete - patient participated  Level of consciousness: awake and alert  Airway patency: patent  Nausea & Vomiting: no nausea and no vomiting  Complications: no  Cardiovascular status: blood pressure returned to baseline  Respiratory status: acceptable and room air  Hydration status: euvolemic

## 2022-03-23 NOTE — OP NOTE
Operative Note      Patient: Jabari Hanna  YOB: 1945  MRN: [id number]    Date of Procedure: 3/23/2022    Pre-Op Diagnosis:   1. Right index finger- P3 infection/ ABSCESS    Post-Op Diagnosis: Same       Procedure(s):  1. RIGHT INDEX FINGER- COMPLETE NAIL AVULSION, temporary  2. Right index finger-  INCISION AND DRAINAGE AND IRRIGATION P3 TISSUES       Surgeon(s):  Ignacia Grigsby M.D.; Hand + Reconstructive Surgery    Assistant:   * No surgical staff found *    Anesthesia: Monitor Anesthesia Care    Estimated Blood Loss (mL): Minimal 5 ml + purulent drainage 1 ml    Complications: None    Specimens/ cultures:   ID Type Source Tests Collected by Time Destination   1 : right index finger nail Tissue Tissue CULTURE, ANAEROBIC, CULTURE, FUNGUS, GRAM STAIN, AFB STAIN, CULTURE, DANETTE Lara MD 3/23/2022 1610    2 : right index finger nail bed  Tissue Tissue CULTURE, ANAEROBIC, CULTURE, FUNGUS, GRAM STAIN, AFB STAIN, CULTURE, DANETTE Lara MD 3/23/2022 1615        Implants:  * No implants in log *      Drains: * No LDAs found *; yes left wide open for post-op drainage. This is a 78years of age, left-hand-dominant, white female, who has been battling a right index finger infection for 2+ weeks. Patient does have artificial nails on top of her real nails. She did develop an irritation/edge of the nail fold which festered into an infection. She had increasing redness from mild to severe, increasing edema from mild to severe, increasing pain from mild to severe. This did prompt her to go to [facility], for emergency medical services. They did try needle aspiration along the proximal nail fold, but no true incision. The report from the patient, was that they were fairly disappointed that they did not get any obvious purulent drainage, the area was not adequately drained, and the patient placed on antibiotics and given antibiotic ointment.   Over the course of the next 24-72 hours, the condition worsened to extreme redness now mixed with a green nail, extreme edema, and extreme pain. Her primary care physician put an emergency consult for the patient with our office-hand surgery, where we did see her the same day. We are able to get her on the surgery schedule the next day for adequate incision and drainage and irrigation. I did meet Robert Jaimes and her daughter in the preoperative holding room, to again answer questions and outline the operation. In the office we did obtain verbal/written informed consent for a right index finger complete nail avulsion, temporary + right index finger incision and drainage and irrigation of the P3 tissues. I did specifically highlight the possible need for any of the nail fold deep incision and irrigation. I also discussed periosteal and P3 bone involvement and palmar P3 soft tissue involvement (possible felon). Patient and daughter voiced understanding. Patient did receive a left upper extremity IV placement per nursing and pre-operative antibiotics. The patient was escorted back to the surgery suite laid supine on the operating table. I did saida the surgery site with a purple marker in the preoperative holding room. Patient then underwent IV sedation per the anesthesia team.  The operating room bed was rotated 90 degrees with anesthesia workbench and the right upper extremity was abducted 70 degrees with the patient's body and placed onto a padded arm table. We did not have a tourniquet on the upper arm. We did have a digit tourniquet on the base of the digit which was up for 20 minutes. Prior to anything invasive, as well as, after were prepped and draped, surgical team did perform to official timeouts. All members were in accordance. I then provided the right index finger proper and dorsal digital nerve blocks, per my standard fashion.   We then prepped the right upper extremity with Betadine solution from the fingertips down to the distal arm. A sterile field was then created using sterile sheets and sterile towels. I did place sterile fluff towel underneath the hand and wrist for protection throughout the case. I first test the patient with a #15 blade scalpel, noting adequate anesthesia. With simple touching of the palmar P3 tissues and nail there was a large amount of gray creamy dorsal/proximal nail fold drainage. This is highly consistent with Staphylococcus infection. I then used a small tenotomy scissors to elevate the radial, proximal, ulnar nail fold off of the underlying nail plate. I used a similar technique to elevate the nail plate off of the nailbed. The pathologic findings were such that there was a very loose nail plate on top of the nailbed, indicative of severe suppurative, subungual infection. There was obviously moderate necrosis of all 3 nail folds and skin, and nail bed. The nail was removed and sent for the 5 outline things/culture. Similarly the deep soft tissues including skin nailbed and nail folds was sent as a separate specimen with a couple cc of normal saline, also sent to the lab. After the nail was removed in a complete fashion (temporary), there was noted to be a very putrid smell to the purulent drainage. This is highly consistent with a mixed/anaerobic component of the infection. At this point I specifically assess the palmar P3 tissues, noting no fluctuance, and no obvious felon. I visually inspected and palpated the entire nailbed noting moderate amount of superficial distraction with severe/extreme inflammatory changes. There was brisk bleeding from multiple points. There was no obvious disruption through the deep layer into the dorsal P3 periosteum. There was no soft points of the P3 bone, neither. I then used 200+ cc normal saline irrigation with a 20 cc syringe and a 20-gauge cut angiocath. This allowed for both pressure and volume irrigation.   The wound was left open, with no sutures for severe post-operative surgical site drainage. At the conclusion of the case the sponge instrument needle counts were correct x2. The extremities were cleaned of Betadine and blood my standard digit pressure dressing, with a soft dressing extending onto the hand and wrist, was then applied. The tourniquet was removed just prior to this. Patient tolerated the operation well was sent to the postanesthesia care unit in stable fair condition, escorted by myself, RN, and CRNA.     Marisela Lanza MD        Electronically signed by Maite Fernandez MD on 3/23/2022 at 4:41 PM

## 2022-03-23 NOTE — ANESTHESIA PRE PROCEDURE
D deficiency E55.9       Past Medical History:        Diagnosis Date    Arthritis     Diabetes mellitus [address]     Fibromyalgia     Hypertension     Osteoarthritis     Spinal stenosis     Type 2 diabetes mellitus without complication ([facility]        Past Surgical History:        Procedure Laterality Date    CARDIAC CATHETERIZATION  2005    NL after false positive stress test    CARPAL TUNNEL RELEASE  1990    HEEL SPUR SURGERY Bilateral 1995    HYSTERECTOMY      WITH BSO FOR EXCESSIVE BLEEDING AND FIBROIDS    JOINT REPLACEMENT      JOINT REPLACEMENT Right     4/28/2015    KNEE ARTHROPLASTY Left 2005    KNEE ARTHROPLASTY Right 2015    TONSILLECTOMY AND ADENOIDECTOMY         Social History:    Social History     Tobacco Use    Smoking status: Never Smoker    Smokeless tobacco: Never Used   Substance Use Topics    Alcohol use: Yes     Comment: social                                Counseling given: Not Answered      Vital Signs (Current):   Vitals:    03/22/22 1650   Weight: 217 lb (98.4 kg)   Height: 5' 5\" (1.651 m)                                              BP Readings from Last 3 Encounters:   11/25/19 134/72   01/12/17 119/66       NPO Status:                                                                                 BMI:   Wt Readings from Last 3 Encounters:   03/22/22 217 lb (98.4 kg)   11/25/19 224 lb 9.6 oz (101.9 kg)   01/12/17 235 lb (106.6 kg)     Body mass index is 36.11 kg/m².     CBC:   Lab Results   Component Value Date    WBC 6.4 11/22/2019    RBC 4.80 11/22/2019    HGB 13.5 11/22/2019    HCT 43.9 11/22/2019    MCV 91.5 11/22/2019    RDW 13.0 11/22/2019     11/22/2019       CMP:   Lab Results   Component Value Date     11/22/2019    K 4.5 11/22/2019     11/22/2019    CO2 23 11/22/2019    BUN 9 11/22/2019    CREATININE 0.7 11/22/2019    GFRAA >60 11/22/2019    LABGLOM >60 11/22/2019    GLUCOSE 156 11/22/2019    PROT 6.9 11/22/2019    CALCIUM 9.2 11/22/2019 BILITOT 0.7 11/22/2019    ALKPHOS 70 11/22/2019    AST 12 11/22/2019    ALT 9 11/22/2019       POC Tests: No results for input(s): POCGLU, POCNA, POCK, POCCL, POCBUN, POCHEMO, POCHCT in the last 72 hours. Coags: No results found for: PROTIME, INR, APTT    HCG (If Applicable): No results found for: PREGTESTUR, PREGSERUM, HCG, HCGQUANT     ABGs: No results found for: PHART, PO2ART, FJQ1YHR, NPR2PDA, BEART, [id number]     Type & Screen (If Applicable):  No results found for: LABABO, LABRH    Drug/Infectious Status (If Applicable):  No results found for: HIV, HEPCAB    COVID-19 Screening (If Applicable): No results found for: COVID19        Anesthesia Evaluation  Patient summary reviewed no history of anesthetic complications:   Airway: Mallampati: II  TM distance: >3 FB   Neck ROM: full  Mouth opening: > = 3 FB Dental: normal exam         Pulmonary:Negative Pulmonary ROS breath sounds clear to auscultation                             Cardiovascular:    (+) hypertension:, hyperlipidemia      ECG reviewed  Rhythm: regular  Rate: normal  Echocardiogram reviewed                  Neuro/Psych:   (+) neuromuscular disease:,             GI/Hepatic/Renal:        (-) liver disease and no renal disease       Endo/Other:    (+) DiabetesType II DM, , : arthritis: OA., .                 Abdominal:         (-) obese       Vascular: Other Findings:             Anesthesia Plan      MAC     ASA 3       Induction: intravenous. MIPS: Postoperative opioids intended and Prophylactic antiemetics administered. Anesthetic plan and risks discussed with patient and child/children. Plan discussed with CRNA.                   Sahne Mackey MD   3/23/2022

## 2022-03-24 LAB — GRAM STAIN ORDERABLE: NORMAL

## 2022-03-25 LAB
AFB SMEAR: NORMAL
AFB SMEAR: NORMAL
GRAM STAIN ORDERABLE: NORMAL

## 2022-03-26 LAB
ANAEROBIC CULTURE: NORMAL
ANAEROBIC CULTURE: NORMAL
CULTURE SURGICAL: ABNORMAL
ORGANISM: ABNORMAL

## 2022-04-04 LAB
FUNGUS (MYCOLOGY) CULTURE: ABNORMAL
FUNGUS STAIN: ABNORMAL
Lab: NORMAL
ORGANISM: ABNORMAL
ORGANISM: ABNORMAL
REPORT: NORMAL
THIS TEST SENT TO: NORMAL

## 2022-04-25 LAB
FUNGUS (MYCOLOGY) CULTURE: NORMAL
FUNGUS STAIN: NORMAL

## (undated) DEVICE — SET SURG BASIN MAYO REUSABLE

## (undated) DEVICE — GLOVE ORANGE PI 8 1/2   MSG9085

## (undated) DEVICE — SYRINGE 20ML LL S/C 50

## (undated) DEVICE — SURGICAL PROCEDURE PACK HND

## (undated) DEVICE — DRAPE,REIN 53X77,STERILE: Brand: MEDLINE

## (undated) DEVICE — CATHETER IV 18 GAX1.25 IN WNG INTROCAN SAFETY

## (undated) DEVICE — DRESSING PETRO W3XL8IN OIL EMUL N ADH GZ KNIT IMPREG CELOS

## (undated) DEVICE — BANDAGE ROLL GZ 3 ST  96/CS

## (undated) DEVICE — BNDG,ELSTC,MATRIX,STRL,2"X5YD,LF,HOOK&LP: Brand: MEDLINE

## (undated) DEVICE — RING TRNQT UNIV DGT TOURNI COT

## (undated) DEVICE — BASIC SINGLE BASIN 1-LF: Brand: MEDLINE INDUSTRIES, INC.

## (undated) DEVICE — GLOVE SURG SZ 8 L12IN FNGR THK94MIL TRNSLUC YEL LTX HYDRGEL

## (undated) DEVICE — DRAIN SURG PENROSE 0.5X12 IN PREM SIL STRL

## (undated) DEVICE — NEEDLE HYPO 25GA L1.5IN BLU POLYPR HUB S STL REG BVL STR

## (undated) DEVICE — TRAP SPEC MUCUS FOR SUCT

## (undated) DEVICE — BNDG,ELSTC,MATRIX,STRL,4"X5YD,LF,HOOK&LP: Brand: MEDLINE

## (undated) DEVICE — TRAY CARPAL TUNNEL SET REUSABLE

## (undated) DEVICE — INSTRUMENT SKIN HOOKS REUSABLE

## (undated) DEVICE — INTENDED FOR TISSUE SEPARATION, AND OTHER PROCEDURES THAT REQUIRE A SHARP SURGICAL BLADE TO PUNCTURE OR CUT.: Brand: BARD-PARKER ® STAINLESS STEEL BLADES

## (undated) DEVICE — TRAY SET HAND REUSABLE

## (undated) DEVICE — GAUZE,SPONGE,4"X4",8PLY,STRL,LF,10/TRAY: Brand: MEDLINE

## (undated) DEVICE — MATERIAL CAST W3INXL125FT 8 15MMHG BLK SUPP SEAMLESS ULT SHR

## (undated) DEVICE — TRAY,SKIN SCRUB,DRY,W/GAUZE: Brand: MEDLINE

## (undated) DEVICE — ELECTRODE PT RET AD L9FT HI MOIST COND ADH HYDRGEL CORDED

## (undated) DEVICE — 4-PORT MANIFOLD: Brand: NEPTUNE 2

## (undated) DEVICE — COVER HNDL LT DISP

## (undated) DEVICE — BLADE OPHTH GRN ROUNDED TIP 1 SIDE SHRP GRINDLESS MINI-BLDE

## (undated) DEVICE — STERILE PVP: Brand: MEDLINE INDUSTRIES, INC.

## (undated) DEVICE — PENCIL ES CRD L10FT HND SWCHING ROCK SWCH W/ EDGE COAT BLDE